# Patient Record
Sex: FEMALE | Race: WHITE
[De-identification: names, ages, dates, MRNs, and addresses within clinical notes are randomized per-mention and may not be internally consistent; named-entity substitution may affect disease eponyms.]

---

## 2018-05-02 ENCOUNTER — HOSPITAL ENCOUNTER (EMERGENCY)
Dept: HOSPITAL 11 - JP.ED | Age: 83
Discharge: HOME | End: 2018-05-02
Payer: MEDICARE

## 2018-05-02 VITALS — SYSTOLIC BLOOD PRESSURE: 134 MMHG | DIASTOLIC BLOOD PRESSURE: 98 MMHG

## 2018-05-02 DIAGNOSIS — I10: ICD-10-CM

## 2018-05-02 DIAGNOSIS — E78.00: ICD-10-CM

## 2018-05-02 DIAGNOSIS — Z79.899: ICD-10-CM

## 2018-05-02 DIAGNOSIS — R55: Primary | ICD-10-CM

## 2018-05-02 DIAGNOSIS — Z79.82: ICD-10-CM

## 2018-05-02 NOTE — EDM.PDOC
ED HPI GENERAL MEDICAL PROBLEM





- General


Chief Complaint: Syncope


Stated Complaint: FALL VIA NORTH


Time Seen by Provider: 05/02/18 18:41


Source of Information: Reports: Patient, RN Notes Reviewed


History Limitations: Reports: No Limitations





- History of Present Illness


INITIAL COMMENTS - FREE TEXT/NARRATIVE: 





85-year-old female presents to the emergency department today via EMS services 

for a syncopal event, she was standing in line at the motor vehicle department 

I suddenly felt lightheaded next thing she knew she awoke on the floor people 

standing around her, there is no confusion afterwards was able to communicate 

with EMS staff upon arrival. She has had an event similar to this in the past 

with no known etiology she denies any new medication states she's been eating 

and drinking okay today. At this time she is asymptomatic





- Related Data


 Allergies











Allergy/AdvReac Type Severity Reaction Status Date / Time


 


No Known Allergies Allergy   Verified 05/02/18 16:03











Home Meds: 


 Home Meds





Ascorbic Acid [Vitamin C] 500 mg PO DAILY 04/19/16 [History]


Aspirin [Ecotrin] 325 mg PO DAILY 04/19/16 [History]


Calcium Carbonate/Vitamin D3 [Calcium 500 + Vit D 400] 1 each PO DAILY 04/19/16 

[History]


Hydrochlorothiazide/Valsartan [Diovan HCT 80-12.5 MG] 0.5 tab PO BID 04/19/16 [

History]


Metoprolol Tartrate 12.5 mg PO BID 04/19/16 [History]


Multivitamin/Iron/Folic Acid [Centrum Adults Tablet] 1 tab PO DAILY 05/02/18 [

History]











Past Medical History


HEENT History: Reports: Impaired Vision


Cardiovascular History: Reports: Angina, CAD, High Cholesterol, Hypertension, MI

, Syncope


Musculoskeletal History: Reports: Fracture


Other Musculoskeletal History: wrist


Psychiatric History: Reports: Anxiety





- Infectious Disease History


Infectious Disease History: Reports: Chicken Pox, Measles, Mumps





- Past Surgical History


Cardiovascular Surgical History: Reports: Percutaneous Transluminal Angioplasty


GI Surgical History: Reports: Colonoscopy





Social & Family History





- Tobacco Use


Smoking Status *Q: Never Smoker





- Caffeine Use


Caffeine Use: Reports: Tea





- Recreational Drug Use


Recreational Drug Use: No





ED ROS GENERAL





- Review of Systems


Review Of Systems: See Below


Constitutional: Reports: No Symptoms


HEENT: Reports: No Symptoms


Respiratory: Reports: No Symptoms


Cardiovascular: Reports: Syncope


GI/Abdominal: Reports: No Symptoms


: Reports: No Symptoms


Musculoskeletal: Reports: No Symptoms


Skin: Reports: No Symptoms


Neurological: Reports: Syncope





ED EXAM, GENERAL





- Physical Exam


Exam: See Below


Free Text/Narrative:: 





Tender across the lower back pelvic region no specific point tenderness


Exam Limited By: No Limitations


General Appearance: Alert, WD/WN, No Apparent Distress


Eye Exam: Bilateral Eye: EOMI, Normal Inspection, PERRL


Ears: Normal External Exam, Normal Canal, Hearing Grossly Normal, Normal TMs


Nose: Normal Inspection, Normal Mucosa, No Blood


Throat/Mouth: Normal Inspection, Normal Lips, Normal Teeth, Normal Gums, Normal 

Oropharynx, Normal Voice, No Airway Compromise


Head: Atraumatic, Normocephalic


Neck: Normal Inspection


Respiratory/Chest: No Respiratory Distress, Lungs Clear, Normal Breath Sounds, 

No Accessory Muscle Use


Cardiovascular: Regular Rate, Rhythm, No Murmur


GI/Abdominal: Soft, Non-Tender


Back Exam: Normal Inspection, Full Range of Motion.  No: CVA Tenderness (R), 

CVA Tenderness (L), Paraspinal Tenderness, Vertebral Tenderness





Course





- Vital Signs


Last Recorded V/S: 


 Last Vital Signs











Temp  97.3 F   05/02/18 16:04


 


Pulse  73   05/02/18 18:51


 


Resp  19   05/02/18 19:48


 


BP  134/98 H  05/02/18 19:48


 


Pulse Ox  90 L  05/02/18 19:48














- Orders/Labs/Meds


Orders: 


 Active Orders 24 hr











 Category Date Time Status


 


 EKG Documentation Completion [RC] ASDIRECTED Care  05/02/18 18:55 Active


 


 Lumbar Spine Min 4V [CR] Stat Exams  05/02/18 18:56 Ordered


 


 Pelvis 1V or 2V [CR] Stat Exams  05/02/18 18:56 Ordered


 


 UA W/MICROSCOPIC [URIN] Urgent Lab  05/02/18 19:19 Ordered


 


 EKG 12 Lead [EK] Stat Ther  05/02/18 18:55 Ordered











Labs: 


 Laboratory Tests











  05/02/18 05/02/18 05/02/18 Range/Units





  18:58 18:58 18:58 


 


WBC  6.2    (4.5-11.0)  K/uL


 


RBC  4.59    (3.30-5.50)  M/uL


 


Hgb  13.4    (12.0-15.0)  g/dL


 


Hct  40.9    (36.0-48.0)  %


 


MCV  89    (80-98)  fL


 


MCH  29    (27-31)  pg


 


MCHC  33    (32-36)  %


 


Plt Count  116 L    (150-400)  K/uL


 


Neut % (Auto)  84 H    (36-66)  %


 


Lymph % (Auto)  8 L    (24-44)  %


 


Mono % (Auto)  8 H    (2-6)  %


 


Eos % (Auto)  0 L    (2-4)  %


 


Baso % (Auto)  0    (0-1)  %


 


Sodium    140  (140-148)  mmol/L


 


Potassium    3.8  (3.6-5.2)  mmol/L


 


Chloride    102  (100-108)  mmol/L


 


Carbon Dioxide    31  (21-32)  mmol/L


 


Anion Gap    7.3  (5.0-14.0)  mmol/L


 


BUN    18  (7-18)  mg/dL


 


Creatinine    0.9  (0.6-1.0)  mg/dL


 


Est Cr Clr Drug Dosing    37.80  mL/min


 


Estimated GFR (MDRD)    60  (>60)  


 


Glucose    126 H  ()  mg/dL


 


Calcium    9.1  (8.5-10.1)  mg/dL


 


CK-MB (CK-2)   0.7   (0-3.6)  mg/mL


 


Troponin I   < 0.017   (0.000-0.056)  ng/mL


 


Urine Color     


 


Urine Appearance     


 


Urine pH     (4.5-8.0)  


 


Ur Specific Gravity     (1.008-1.030)  


 


Urine Protein     (NEGATIVE)  mg/dL


 


Urine Glucose (UA)     (NEGATIVE)  mg/dL


 


Urine Ketones     (NEGATIVE)  mg/dL


 


Urine Occult Blood     (NEGATIVE)  


 


Urine Nitrite     (NEGATIVE)  


 


Urine Bilirubin     (NEGATIVE)  


 


Urine Urobilinogen     (NORMAL)  mg/dL


 


Ur Leukocyte Esterase     (NEGATIVE)  


 


Urine RBC     (0-5)  


 


Urine WBC     (0-5)  


 


Ur Epithelial Cells     


 


Amorphous Sediment     


 


Urine Bacteria     


 


Urine Mucus     














  05/02/18 Range/Units





  19:19 


 


WBC   (4.5-11.0)  K/uL


 


RBC   (3.30-5.50)  M/uL


 


Hgb   (12.0-15.0)  g/dL


 


Hct   (36.0-48.0)  %


 


MCV   (80-98)  fL


 


MCH   (27-31)  pg


 


MCHC   (32-36)  %


 


Plt Count   (150-400)  K/uL


 


Neut % (Auto)   (36-66)  %


 


Lymph % (Auto)   (24-44)  %


 


Mono % (Auto)   (2-6)  %


 


Eos % (Auto)   (2-4)  %


 


Baso % (Auto)   (0-1)  %


 


Sodium   (140-148)  mmol/L


 


Potassium   (3.6-5.2)  mmol/L


 


Chloride   (100-108)  mmol/L


 


Carbon Dioxide   (21-32)  mmol/L


 


Anion Gap   (5.0-14.0)  mmol/L


 


BUN   (7-18)  mg/dL


 


Creatinine   (0.6-1.0)  mg/dL


 


Est Cr Clr Drug Dosing   mL/min


 


Estimated GFR (MDRD)   (>60)  


 


Glucose   ()  mg/dL


 


Calcium   (8.5-10.1)  mg/dL


 


CK-MB (CK-2)   (0-3.6)  mg/mL


 


Troponin I   (0.000-0.056)  ng/mL


 


Urine Color  Yellow  


 


Urine Appearance  Clear  


 


Urine pH  7.0  (4.5-8.0)  


 


Ur Specific Gravity  1.015  (1.008-1.030)  


 


Urine Protein  Negative  (NEGATIVE)  mg/dL


 


Urine Glucose (UA)  Normal  (NEGATIVE)  mg/dL


 


Urine Ketones  Negative  (NEGATIVE)  mg/dL


 


Urine Occult Blood  Negative  (NEGATIVE)  


 


Urine Nitrite  Negative  (NEGATIVE)  


 


Urine Bilirubin  Negative  (NEGATIVE)  


 


Urine Urobilinogen  Normal  (NORMAL)  mg/dL


 


Ur Leukocyte Esterase  Negative  (NEGATIVE)  


 


Urine RBC  5-10 H  (0-5)  


 


Urine WBC  0-5  (0-5)  


 


Ur Epithelial Cells  Few  


 


Amorphous Sediment  Few  


 


Urine Bacteria  Many  


 


Urine Mucus  Moderate  














Departure





- Departure


Time of Disposition: 20:11


Disposition: Home, Self-Care 01


Condition: Good


Clinical Impression: 


Syncope


Qualifiers:


 Syncope type: vasovagal syncope Qualified Code(s): R55 - Syncope and collapse





Referrals: 


PCP,None [Primary Care Provider] - 


Forms:  ED Department Discharge


Additional Instructions: 


Take ibuprofen or Tylenol as needed for pain control,  Please followup with 

your primary care provider in 3-5 days if not better, please call return to the 

emergency department with worsening of symptoms.





- My Orders


Last 24 Hours: 


My Active Orders





05/02/18 18:55


EKG Documentation Completion [RC] ASDIRECTED 


EKG 12 Lead [EK] Stat 





05/02/18 18:56


Lumbar Spine Min 4V [CR] Stat 


Pelvis 1V or 2V [CR] Stat 





05/02/18 19:19


UA W/MICROSCOPIC [URIN] Urgent 














- Assessment/Plan


Last 24 Hours: 


My Active Orders





05/02/18 18:55


EKG Documentation Completion [RC] ASDIRECTED 


EKG 12 Lead [EK] Stat 





05/02/18 18:56


Lumbar Spine Min 4V [CR] Stat 


Pelvis 1V or 2V [CR] Stat 





05/02/18 19:19


UA W/MICROSCOPIC [URIN] Urgent 











Plan: 





Assessment





Acuity = acute





Site and laterality = syncopal event  





Etiology  = suspicious for vasovagal  





Manifestations = none  





Location of injury =  Home





Lab values = CBC, CMP, urinalysis unremarkable EKG demonstrates sinus rhythm 

similar to prior EKG  plain film of pelvis and lumbar region I did review films 

myself I cannot appreciate any acute process, the official read from radiology 

is pending





Plan


She remained asymptomatic while in the emergency department, plan is to 

discharge home follow-up primary care 3-5 days if no improvement  

















 This note was dictated using dragon voice recognition software please call 

with any questions on syntax or tawnya.

## 2018-05-03 NOTE — CR
PELVIS AP

Clinical History: Pelvic pain

 

Findings:  No acute fracture or dislocation is noted.  No destructive changes are present. There is s
evere degenerative changes in both hips with loss of joint spaces. There is been some medial migratio
n of the femoral heads bilaterally. There are moderate degenerative changes also in the lumbar spine.


 

Impression: Severe degenerative changes  in both hips. This could be from rheumatoid arthritis. Clini
alanis correlation necessary

## 2018-05-03 NOTE — CR
Lumbar Spine Min 4V

 

CLINICAL HISTORY: Back pain, fall

 

FINDINGS: There is some superior endplate compression deformity at L2. Chronology is uncertain area t
here is some diffuse degenerative disc disease with spondylosis. There is disc space narrowing at the
 L4-5 and L5-S1. There is osteoarthropathy in the mid to lower lumbar facets.

 

IMPRESSION: Mild superior endplate compression deformity of L2 of unknown age

 

 Moderate degenerative disc disease

 

Moderate diffuse osteoarthropathy throughout the mid to low lumbar facets

## 2018-05-04 ENCOUNTER — HOSPITAL ENCOUNTER (EMERGENCY)
Dept: HOSPITAL 11 - JP.ED | Age: 83
Discharge: HOME | End: 2018-05-04
Payer: MEDICARE

## 2018-05-04 VITALS — DIASTOLIC BLOOD PRESSURE: 71 MMHG | SYSTOLIC BLOOD PRESSURE: 157 MMHG

## 2018-05-04 DIAGNOSIS — Z79.899: ICD-10-CM

## 2018-05-04 DIAGNOSIS — S32.2XXD: Primary | ICD-10-CM

## 2018-05-04 DIAGNOSIS — F41.9: ICD-10-CM

## 2018-05-04 DIAGNOSIS — I25.2: ICD-10-CM
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
## 2018-05-04 DIAGNOSIS — Z79.82: ICD-10-CM

## 2018-05-04 DIAGNOSIS — E78.00: ICD-10-CM

## 2018-05-04 DIAGNOSIS — I10: ICD-10-CM

## 2018-05-04 PROCEDURE — 99284 EMERGENCY DEPT VISIT MOD MDM: CPT

## 2018-05-04 PROCEDURE — 85379 FIBRIN DEGRADATION QUANT: CPT

## 2018-05-04 PROCEDURE — 71275 CT ANGIOGRAPHY CHEST: CPT

## 2018-05-04 PROCEDURE — 36415 COLL VENOUS BLD VENIPUNCTURE: CPT

## 2018-05-04 PROCEDURE — 72192 CT PELVIS W/O DYE: CPT

## 2018-05-04 PROCEDURE — 96374 THER/PROPH/DIAG INJ IV PUSH: CPT

## 2018-05-04 NOTE — EDM.PDOC
ED HPI GENERAL MEDICAL PROBLEM





- General


Chief Complaint: Back Pain or Injury


Stated Complaint: MEDICAL VIA NORTH


Time Seen by Provider: 05/04/18 09:05


Source of Information: Reports: Patient, EMS, Old Records, RN


History Limitations: Reports: No Limitations





- History of Present Illness


INITIAL COMMENTS - FREE TEXT/NARRATIVE: 





86 yo female had a syncopal episode 2 days ago at the [facility] while she had been 

standing for an extended period of time. With the syncope she landed forcibly 

on her coccyx and was brought here for eval. Blood work showed a mildly 

elevated BNP, otherwise normal. X-rays showed DJD and a possible L2 compression 

fx undetermined age. She was sent home to use OTC analgesia. During that visit 

she had a pulse ox reading of about 90% and has no hx of lung dz. 





Today she returns via EMS with a complaint of sacral/coccyx pain. She last took 

ibuprofen last night for pain relief. "Someone told her not to take any pain 

medicine today". She is not SOB. She denies cough or fever, does have a little 

post-nasal drip. Has no hx of asthma. 


Onset: Sudden


Onset Date: 05/02/18


Duration: Day(s): (2), Constant


Location: Reports: Pelvis (posteriorly)


Quality: Reports: Ache


Severity: Moderate


Improves with: Reports: Medication


Worsens with: Reports: Movement


Context: Reports: Trauma


Associated Symptoms: Reports: No Other Symptoms.  Denies: Diaphoresis, Fever/

Chills, Nausea/Vomiting


Treatments PTA: Reports: Other (see below) (none)


  ** Lower Sacral


Pain Score (Numeric/FACES): 7





- Related Data


 Allergies











Allergy/AdvReac Type Severity Reaction Status Date / Time


 


No Known Allergies Allergy   Verified 05/02/18 16:03











Home Meds: 


 Home Meds





Ascorbic Acid [Vitamin C] 500 mg PO DAILY 04/19/16 [History]


Aspirin [Ecotrin] 325 mg PO DAILY 04/19/16 [History]


Calcium Carbonate/Vitamin D3 [Calcium 500 + Vit D 400] 1 each PO DAILY 04/19/16 

[History]


Hydrochlorothiazide/Valsartan [Diovan HCT 80-12.5 MG] 0.5 tab PO BID 04/19/16 [

History]


Metoprolol Tartrate 12.5 mg PO BID 04/19/16 [History]


Multivitamin/Iron/Folic Acid [Centrum Adults Tablet] 1 tab PO DAILY 05/02/18 [

History]


Escitalopram [Lexapro] 10 mg PO DAILY 05/04/18 [History]











Past Medical History


HEENT History: Reports: Impaired Vision


Cardiovascular History: Reports: Angina, CAD, High Cholesterol, Hypertension, MI

, Syncope


Musculoskeletal History: Reports: Fracture


Other Musculoskeletal History: wrist


Psychiatric History: Reports: Anxiety


Hematologic History: Reports: Hemochromatosis





- Infectious Disease History


Infectious Disease History: Reports: Chicken Pox, Measles, Mumps





- Past Surgical History


Cardiovascular Surgical History: Reports: Percutaneous Transluminal Angioplasty


GI Surgical History: Reports: Colonoscopy





Social & Family History





- Tobacco Use


Smoking Status *Q: Never Smoker





- Caffeine Use


Caffeine Use: Reports: Coffee





- Recreational Drug Use


Recreational Drug Use: No





ED ROS GENERAL





- Review of Systems


Review Of Systems: See Below


Constitutional: Reports: No Symptoms


HEENT: Reports: No Symptoms


Respiratory: Reports: No Symptoms


Cardiovascular: Reports: No Symptoms


GI/Abdominal: Reports: No Symptoms


: Reports: No Symptoms


Musculoskeletal: Reports: Back Pain (low)


Skin: Reports: No Symptoms


Neurological: Reports: No Symptoms





ED EXAM,LOWER BACK PAIN/INJURY





- Physical Exam


Exam: See Below


Exam Limited By: No Limitations


General Appearance: Alert, WD/WN, No Apparent Distress


Eye Exam: Bilateral Eye: Normal Inspection


Ears: Normal External Exam, Normal Canal, Hearing Grossly Normal


Nose: Normal Inspection, Normal Mucosa, No Blood


Throat/Mouth: Normal Inspection, Normal Lips, Normal Oropharynx, Normal Voice, 

No Airway Compromise


Head: Atraumatic, Normocephalic


Neck: Normal Inspection


Respiratory/Chest: No Respiratory Distress, Lungs Clear, Normal Breath Sounds, 

No Accessory Muscle Use


Cardiovascular: Regular Rate, Rhythm, No Edema


GI/Abdominal: Normal Bowel Sounds, Soft, Non-Tender, No Distention


Back Exam: Normal Inspection, Other (Tender over sacrum and coccyx areas, not 

tender over L2).  No: CVA Tenderness (R), CVA Tenderness (L)


Extremities: Normal Inspection, Normal Range of Motion, Non-Tender, No Pedal 

Edema


Neurological: Alert, Normal Mood/Affect, CN II-XII Intact, No Motor/Sensory 

Deficits, Oriented x 3


Psychiatric: Normal Affect, Normal Mood


Skin Exam: Warm, Dry, Intact, Normal Color, No Rash.  No: Ecchymosis, Erythema, 

Increased Warmth


Lymphatic: No Adenopathy





Course





- Vital Signs


Text/Narrative:: 





Social service consult obtained regarding home care. 


Last Recorded V/S: 


 Last Vital Signs











Temp  35.4 C   05/04/18 09:02


 


Pulse  80   05/04/18 09:02


 


Resp  16   05/04/18 09:02


 


BP  157/71 H  05/04/18 09:02


 


Pulse Ox  91 L  05/04/18 09:02














- Orders/Labs/Meds


Orders: 


 Active Orders 24 hr











 Category Date Time Status


 


 Iopamidol [Isovue-370 (76%)] Med  05/04/18 10:15 Active





 65 ml IV .AS DIRECTED   


 


 Sodium Chloride 0.9% [Saline Flush] Med  05/04/18 09:13 Active





 10 ml FLUSH ASDIRECTED PRN   


 


 Sodium Chloride 0.9% [Saline Flush] Med  05/04/18 10:02 Active





 10 ml FLUSH ONETIME PRN   


 


 Saline Lock Insert [OM.PC] Routine Oth  05/04/18 09:13 Ordered








 Medication Orders





Iopamidol (Isovue-370 (76%))  65 ml IV .AS DIRECTED MONSTER


   Last Admin: 05/04/18 10:21  Dose: 100 ml


Sodium Chloride (Saline Flush)  10 ml FLUSH ASDIRECTED PRN


   PRN Reason: Keep Vein Open


   Last Admin: 05/04/18 09:29  Dose: 10 ml


Sodium Chloride (Saline Flush)  10 ml FLUSH ONETIME PRN


   PRN Reason: PER RADIOLOGY PROTOCOL


   Last Admin: 05/04/18 10:21  Dose: 10 ml








Labs: 


 Laboratory Tests











  05/04/18 Range/Units





  09:25 


 


D-Dimer, Quantitative  4170 H  (0.0-400.0)  ng/mL











Meds: 


Medications











Generic Name Dose Route Start Last Admin





  Trade Name Freq  PRN Reason Stop Dose Admin


 


Iopamidol  65 ml  05/04/18 10:15  05/04/18 10:21





  Isovue-370 (76%)  IV   100 ml





  .AS DIRECTED MONSTER   Administration





     





     





     





     


 


Sodium Chloride  10 ml  05/04/18 09:13  05/04/18 09:29





  Saline Flush  FLUSH   10 ml





  ASDIRECTED PRN   Administration





  Keep Vein Open   





     





     





     


 


Sodium Chloride  10 ml  05/04/18 10:02  05/04/18 10:21





  Saline Flush  FLUSH   10 ml





  ONETIME PRN   Administration





  PER RADIOLOGY PROTOCOL   





     





     





     














Discontinued Medications














Generic Name Dose Route Start Last Admin





  Trade Name Freq  PRN Reason Stop Dose Admin


 


Acetaminophen  1,000 mg  05/04/18 09:15  05/04/18 09:26





  Tylenol Extra Strength  PO  05/04/18 09:16  1,000 mg





  ONETIME ONE   Administration





     





     





     





     


 


Sodium Chloride  80 mls @ 3 mls/sec  05/04/18 10:02  05/04/18 10:21





  Normal Saline  IV  05/04/18 10:03  3 mls/sec





  ONETIME ONE   Administration





     





     





     





     


 


Ketorolac Tromethamine  15 mg  05/04/18 09:13  05/04/18 09:26





  Toradol  IVPUSH  05/04/18 09:14  15 mg





  ONETIME ONE   Administration





     





     





     





     














- Radiology Interpretation


Free Text/Narrative:: 





CTA chest-no PE





CT pelvis-distal coccyx fx


CT Results Date: 05/04/18





Departure





- Departure


Time of Disposition: 12:14


Disposition: Home, Self-Care 01


Condition: Fair


Clinical Impression: 


Fractured coccyx


Qualifiers:


 Encounter type: subsequent encounter Fracture type: closed Fracture healing: 

with routine healing Qualified Code(s): S32.2XXD - Fracture of coccyx, 

subsequent encounter for fracture with routine healing








- Discharge Information


Referrals: 


PCP,None [Primary Care Provider] - 


Forms:  ED Department Discharge





- My Orders


Last 24 Hours: 


My Active Orders





05/04/18 09:13


Sodium Chloride 0.9% [Saline Flush]   10 ml FLUSH ASDIRECTED PRN 


Saline Lock Insert [OM.PC] Routine 





05/04/18 10:02


Sodium Chloride 0.9% [Saline Flush]   10 ml FLUSH ONETIME PRN 





05/04/18 10:15


Iopamidol [Isovue-370 (76%)]   65 ml IV .AS DIRECTED 














- Assessment/Plan


Last 24 Hours: 


My Active Orders





05/04/18 09:13


Sodium Chloride 0.9% [Saline Flush]   10 ml FLUSH ASDIRECTED PRN 


Saline Lock Insert [OM.PC] Routine 





05/04/18 10:02


Sodium Chloride 0.9% [Saline Flush]   10 ml FLUSH ONETIME PRN 





05/04/18 10:15


Iopamidol [Isovue-370 (76%)]   65 ml IV .AS DIRECTED

## 2018-05-04 NOTE — CT
CTA CHEST

 

CLINICAL HISTORY: APPROXIMATELY A, ELEVATED D-DIMER

 

TECHNIQUE: THIN SECTION AXIAL CONTIGUOUS TOMOGRAPHIC SECTIONS WERE TAKEN THROUGH THE CHEST BEFORE AND
 AFTER  ML BOLUS IV IODINATED CONTRAST ADMINISTRATION. CORONAL AND SAGITTAL IMAGES WERE RECONSTRUCTED
. 

 

FINDINGS: THERE IS MILD DIFFUSE INTERSTITIAL PROMINENCE. THERE IS MILD BRONCHIECTASIS WITH SOME MILD 
DIFFUSE BRONCHIAL THICKENING. THERE IS SOME PATCHY PLEURAL PARENCHYMAL SCARRING IN BOTH LUNG BASES. T
HIS IS MOST NOTABLE IN THE LINGULA.

NO PULMONARY ARTERY FILLING DEFECTS ARE IDENTIFIED. THE AORTA IS FREE OF ANEURYSM OR DISSECTION. THER
E ARE NO PLEURAL EFFUSIONS

 

 

IMPRESSION: MILD DIFFUSE CHRONIC LUNG CHANGES WITH SOME BRONCHIECTASIS AND BRONCHIAL THICKENING

 

SCATTERED AREAS OF PLEURAL PARENCHYMAL SCARRING

 

NO EVIDENCE OF PULMONARY EMBOLUS

## 2018-05-04 NOTE — CT
Pelvis wo Cont

 

CLINICAL HISTORY: Fall, pelvic pain 

 

COMPARISON: None

 

TECHNIQUE: Multiple contiguous axial sections were obtained from the level of the iliac crests down t
o the pubic symphysis without 

 From these images 3D reconstructions were obtained and reviewed . Auto dosage reduction and iterativ
e reconstruction techniques employed.

 

FINDINGS: 1 window images show the pelvic bones to be intact. There are severe degenerative changes i
n both hips with joint space narrowing periarticular spurring and subcortical cyst formation. Sagitta
l images show small linear defect through the second coccygeal segment consistent with a nondisplaced
 fracture. Sacrum appears intact. There are some degenerative changes in the SI joints with some vacu
um phenomenon.

 

IMPRESSION: Nondisplaced fracture of the second coccygeal segment

 

Severe degenerative changes in both hips

## 2018-05-08 ENCOUNTER — HOSPITAL ENCOUNTER (EMERGENCY)
Dept: HOSPITAL 11 - JP.ED | Age: 83
Discharge: HOME | End: 2018-05-08
Payer: MEDICARE

## 2018-05-08 VITALS — SYSTOLIC BLOOD PRESSURE: 160 MMHG | DIASTOLIC BLOOD PRESSURE: 98 MMHG

## 2018-05-08 DIAGNOSIS — I10: ICD-10-CM

## 2018-05-08 DIAGNOSIS — I25.2: ICD-10-CM

## 2018-05-08 DIAGNOSIS — Z79.82: ICD-10-CM

## 2018-05-08 DIAGNOSIS — I16.0: Primary | ICD-10-CM

## 2018-05-08 DIAGNOSIS — Z79.899: ICD-10-CM

## 2018-05-08 PROCEDURE — 83880 ASSAY OF NATRIURETIC PEPTIDE: CPT

## 2018-05-08 PROCEDURE — 70450 CT HEAD/BRAIN W/O DYE: CPT

## 2018-05-08 PROCEDURE — 80048 BASIC METABOLIC PNL TOTAL CA: CPT

## 2018-05-08 PROCEDURE — 85025 COMPLETE CBC W/AUTO DIFF WBC: CPT

## 2018-05-08 PROCEDURE — 36415 COLL VENOUS BLD VENIPUNCTURE: CPT

## 2018-05-08 PROCEDURE — 99285 EMERGENCY DEPT VISIT HI MDM: CPT

## 2018-05-08 PROCEDURE — 81001 URINALYSIS AUTO W/SCOPE: CPT

## 2018-05-08 PROCEDURE — 71046 X-RAY EXAM CHEST 2 VIEWS: CPT

## 2018-05-08 PROCEDURE — G0480 DRUG TEST DEF 1-7 CLASSES: HCPCS

## 2018-05-08 NOTE — EDM.PDOC
<Jenny Berrios - Last Filed: 05/08/18 18:43>





ED HPI GENERAL MEDICAL PROBLEM





- General


Chief Complaint: Cardiovascular Problem


Stated Complaint: MEDICAL VIA NORTH


Time Seen by Provider: 05/08/18 18:09


Source of Information: Reports: Patient


History Limitations: Reports: No Limitations





- History of Present Illness


INITIAL COMMENTS - FREE TEXT/NARRATIVE: 





pt arrived with her bp being elevated. She has been  taking her meds.  She 

states she can,t think  right today. She  Has not taken the norco today. She 

has just been using the ibuprofen.  


Onset: Today


Duration: Hour(s):, Other ( she does not have a sig hesdache. )


Location: Reports: Head


  ** Sacral


Pain Score (Numeric/FACES): 3





- Related Data


 Allergies











Allergy/AdvReac Type Severity Reaction Status Date / Time


 


No Known Allergies Allergy   Verified 05/08/18 15:11











Home Meds: 


 Home Meds





Ascorbic Acid [Vitamin C] 500 mg PO DAILY 04/19/16 [History]


Aspirin [Ecotrin] 325 mg PO DAILY 04/19/16 [History]


Calcium Carbonate/Vitamin D3 [Calcium 500 + Vit D 400] 1 each PO DAILY 04/19/16 

[History]


Hydrochlorothiazide/Valsartan [Diovan HCT 80-12.5 MG] 0.5 tab PO BID 04/19/16 [

History]


Metoprolol Tartrate 12.5 mg PO BID 04/19/16 [History]


Multivitamin/Iron/Folic Acid [Centrum Adults Tablet] 1 tab PO DAILY 05/02/18 [

History]


Acetaminophen/HYDROcodone [Norco 325-5 MG] 1 - 2 tab PO Q6H PRN #30 tab 05/04/ 18 [Rx]


Escitalopram [Lexapro] 10 mg PO DAILY 05/04/18 [History]











Past Medical History


HEENT History: Reports: Impaired Vision


Cardiovascular History: Reports: Angina, CAD, High Cholesterol, Hypertension, MI

, Syncope


Musculoskeletal History: Reports: Fracture


Other Musculoskeletal History: wrist


Psychiatric History: Reports: Anxiety


Hematologic History: Reports: Hemochromatosis





- Infectious Disease History


Infectious Disease History: Reports: Chicken Pox, Measles, Mumps





- Past Surgical History


Cardiovascular Surgical History: Reports: Percutaneous Transluminal Angioplasty


GI Surgical History: Reports: Colonoscopy





Social & Family History





- Tobacco Use


Smoking Status *Q: Unknown Ever Smoked





- Caffeine Use


Caffeine Use: Reports: Coffee





ED ROS GENERAL





- Review of Systems


Review Of Systems: See Below


Constitutional: Reports: No Symptoms


HEENT: Reports: No Symptoms


Respiratory: Reports: No Symptoms


Cardiovascular: Reports: Palpitations


Endocrine: Reports: No Symptoms


GI/Abdominal: Reports: No Symptoms


: Reports: No Symptoms


Musculoskeletal: Reports: No Symptoms


Skin: Reports: No Symptoms


Neurological: Reports: Other ( She feels like she is not thinking straight. )


Psychiatric: Reports: Anxiety





ED EXAM, GENERAL





- Physical Exam


Exam: See Below


Free Text/Narrative:: 





pt arrived with her bp being up and she just does not feel right. 


Exam Limited By: No Limitations


General Appearance: Alert, Mild Distress, Other (pupils are equal and reactive)


Ears: Normal TMs


Nose: Normal Inspection


Throat/Mouth: Normal Inspection


Head: Other (pt has slight tenderness on the back of her head. )


Neck: Normal Inspection


Respiratory/Chest: No Respiratory Distress, Other (pt states she has been 

somewhat sob with activity. )


Cardiovascular: Regular Rate, Rhythm, Other ( bp is elevated 190/70. )


GI/Abdominal: Soft, Non-Tender


Rectal (Female) Exam: Deferred


Back Exam: Normal Inspection


Extremities: Normal Inspection


Neurological: Alert, Oriented


Psychiatric: Anxious





Course





- Vital Signs


Last Recorded V/S: 





 Last Vital Signs











Temp  97.0 F   05/08/18 17:46


 


Pulse  62   05/08/18 21:05


 


Resp  15   05/08/18 21:05


 


BP  160/98 H  05/08/18 21:29


 


Pulse Ox  96   05/08/18 20:39














- Orders/Labs/Meds


Orders: 





 Active Orders 24 hr











 Category Date Time Status


 


 Chest 2V [CR] Stat Exams  05/08/18 17:13 Taken


 


 Head wo Cont [CT] Stat Exams  05/08/18 18:43 Taken


 


 UA W/MICROSCOPIC [URIN] Urgent Lab  05/08/18 16:04 Ordered











Labs: 





 Laboratory Tests











  05/08/18 05/08/18 05/08/18 Range/Units





  16:04 16:14 16:14 


 


WBC    5.4  (4.5-11.0)  K/uL


 


RBC    4.63  (3.30-5.50)  M/uL


 


Hgb    13.7  (12.0-15.0)  g/dL


 


Hct    40.6  (36.0-48.0)  %


 


MCV    88  (80-98)  fL


 


MCH    30  (27-31)  pg


 


MCHC    34  (32-36)  %


 


Plt Count    250  (150-400)  K/uL


 


Neut % (Auto)    64  (36-66)  %


 


Lymph % (Auto)    16 L  (24-44)  %


 


Mono % (Auto)    13 H  (2-6)  %


 


Eos % (Auto)    6 H  (2-4)  %


 


Baso % (Auto)    1  (0-1)  %


 


Sodium   138 L   (140-148)  mmol/L


 


Potassium   4.0   (3.6-5.2)  mmol/L


 


Chloride   100   (100-108)  mmol/L


 


Carbon Dioxide   29   (21-32)  mmol/L


 


Anion Gap   13.0   (5.0-14.0)  mmol/L


 


BUN   14   (7-18)  mg/dL


 


Creatinine   0.8   (0.6-1.0)  mg/dL


 


Est Cr Clr Drug Dosing   40.66   mL/min


 


Estimated GFR (MDRD)   > 60   (>60)  


 


Glucose   115 H   ()  mg/dL


 


Calcium   9.6   (8.5-10.1)  mg/dL


 


NT-Pro-B Natriuret Pep     (5-450)  pg/mL


 


Urine Color  Yellow    


 


Urine Appearance  Clear    


 


Urine pH  7.0    (4.5-8.0)  


 


Ur Specific Gravity  1.010    (1.008-1.030)  


 


Urine Protein  Negative    (NEGATIVE)  mg/dL


 


Urine Glucose (UA)  Normal    (NEGATIVE)  mg/dL


 


Urine Ketones  Negative    (NEGATIVE)  mg/dL


 


Urine Occult Blood  Negative    (NEGATIVE)  


 


Urine Nitrite  Negative    (NEGATIVE)  


 


Urine Bilirubin  Negative    (NEGATIVE)  


 


Urine Urobilinogen  Normal    (NORMAL)  mg/dL


 


Ur Leukocyte Esterase  Negative    (NEGATIVE)  


 


Urine RBC  Not seen    (0-5)  


 


Urine WBC  0-5    (0-5)  


 


Ur Epithelial Cells  Rare    


 


Amorphous Sediment  Not seen    


 


Urine Bacteria  Rare    


 


Urine Mucus  Not seen    


 


Ethyl Alcohol     mg/dL














  05/08/18 05/08/18 Range/Units





  16:14 16:14 


 


WBC    (4.5-11.0)  K/uL


 


RBC    (3.30-5.50)  M/uL


 


Hgb    (12.0-15.0)  g/dL


 


Hct    (36.0-48.0)  %


 


MCV    (80-98)  fL


 


MCH    (27-31)  pg


 


MCHC    (32-36)  %


 


Plt Count    (150-400)  K/uL


 


Neut % (Auto)    (36-66)  %


 


Lymph % (Auto)    (24-44)  %


 


Mono % (Auto)    (2-6)  %


 


Eos % (Auto)    (2-4)  %


 


Baso % (Auto)    (0-1)  %


 


Sodium    (140-148)  mmol/L


 


Potassium    (3.6-5.2)  mmol/L


 


Chloride    (100-108)  mmol/L


 


Carbon Dioxide    (21-32)  mmol/L


 


Anion Gap    (5.0-14.0)  mmol/L


 


BUN    (7-18)  mg/dL


 


Creatinine    (0.6-1.0)  mg/dL


 


Est Cr Clr Drug Dosing    mL/min


 


Estimated GFR (MDRD)    (>60)  


 


Glucose    ()  mg/dL


 


Calcium    (8.5-10.1)  mg/dL


 


NT-Pro-B Natriuret Pep  2413 H   (5-450)  pg/mL


 


Urine Color    


 


Urine Appearance    


 


Urine pH    (4.5-8.0)  


 


Ur Specific Gravity    (1.008-1.030)  


 


Urine Protein    (NEGATIVE)  mg/dL


 


Urine Glucose (UA)    (NEGATIVE)  mg/dL


 


Urine Ketones    (NEGATIVE)  mg/dL


 


Urine Occult Blood    (NEGATIVE)  


 


Urine Nitrite    (NEGATIVE)  


 


Urine Bilirubin    (NEGATIVE)  


 


Urine Urobilinogen    (NORMAL)  mg/dL


 


Ur Leukocyte Esterase    (NEGATIVE)  


 


Urine RBC    (0-5)  


 


Urine WBC    (0-5)  


 


Ur Epithelial Cells    


 


Amorphous Sediment    


 


Urine Bacteria    


 


Urine Mucus    


 


Ethyl Alcohol   < 3  mg/dL











Meds: 





Medications














Discontinued Medications














Generic Name Dose Route Start Last Admin





  Trade Name Freq  PRN Reason Stop Dose Admin


 


Hydrochlorothiazide  12.5 mg  05/08/18 19:52  05/08/18 20:10





  Hydrochlorothiazide  PO  05/08/18 19:53  12.5 mg





  ONETIME ONE   Administration





     





     





     





     


 


Ibuprofen  600 mg  05/08/18 16:02  05/08/18 16:06





  Motrin  PO  05/08/18 16:03  600 mg





  ONETIME ONE   Administration





     





     





     





     


 


Lorazepam  1 mg  05/08/18 21:10  05/08/18 21:18





  Ativan  PO  05/08/18 21:11  1 mg





  ONETIME ONE   Administration





     





     





     





     


 


Metoprolol Tartrate  12.5 mg  05/08/18 18:46  05/08/18 18:54





  Lopressor  PO  05/08/18 18:47  12.5 mg





  ONETIME ONE   Administration





     





     





     





     


 


Valsartan  40 mg  05/08/18 19:52  05/08/18 20:11





  Diovan  PO  05/08/18 19:53  40 mg





  ONETIME ONE   Administration





     





     





     





     














- Re-Assessments/Exams


Free Text/Narrative Re-Assessment/Exam: 





05/08/18 19:02


 chest xray looked ok. Her chem looked ok.  urine is clear. Will proceed with a 

cat scan of the head. 


05/08/18 19:02








Departure





- Departure


Disposition: Home, Self-Care 01


Clinical Impression: 


 Hypertensive urgency





Referrals: 


PCP,None [Primary Care Provider] - 


Forms:  ED Department Discharge


Additional Instructions: 


Resume your regular medications, please follow-up with your primary care 

provider in the next 2-3 days for reevaluation





<MikalarRobert - Last Filed: 05/08/18 21:37>





Departure





- Departure


Time of Disposition: 21:36


Condition: Fair





- Assessment/Plan


Plan: 





Assessment





Acuity = acute





Site and laterality = illness  with poor blood pressure control





Etiology  = unclear etiology  





Manifestations = none 





Location of injury =  Home





Lab values = exceeding a CBC, CMP unremarkable BNP elevated approximately 2400  

consistent with a fluid overload type pattern, CT scan of the head shows no 

acute process





Plan


Plan is discharge home follow-up primary care in the next 2-3 days for 

reevaluation recommend more aggressive diuretics for fluid overload pattern 

blood pressure on discharge was in the 160s systolic range  

















 This note was dictated using dragon voice recognition software please call 

with any questions on syntax or grammar.

## 2018-05-09 NOTE — CR
Chest 2V

 

INDICATION: sob fatique

 

FINDINGS: Comparison 4/19/2016. Mild hyperinflation. Blunting of the costophrenic angles. Mild scatte
red fibrotic changes, stable. Minimal infiltrate or atelectasis left lung base, new since prior exam.
 Hypertrophic changes thoracic spine. Aortic calcification. Chest otherwise negative.

## 2019-04-13 ENCOUNTER — HOSPITAL ENCOUNTER (EMERGENCY)
Dept: HOSPITAL 11 - JP.ED | Age: 84
Discharge: HOME | End: 2019-04-13
Payer: MEDICARE

## 2019-04-13 VITALS — SYSTOLIC BLOOD PRESSURE: 191 MMHG | DIASTOLIC BLOOD PRESSURE: 88 MMHG

## 2019-04-13 DIAGNOSIS — Z79.899: ICD-10-CM
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
## 2019-04-13 DIAGNOSIS — I10: ICD-10-CM

## 2019-04-13 DIAGNOSIS — S63.502A: Primary | ICD-10-CM

## 2019-04-13 DIAGNOSIS — Z79.82: ICD-10-CM

## 2019-04-13 NOTE — EDM.PDOC
ED HPI GENERAL MEDICAL PROBLEM





- General


Chief Complaint: Neurological Problem


Stated Complaint: DIZZINESS, WEAKNESS


Time Seen by Provider: 04/13/19 12:48


Source of Information: Reports: Patient, Old Records, RN Notes Reviewed


History Limitations: Reports: No Limitations





- History of Present Illness


INITIAL COMMENTS - FREE TEXT/NARRATIVE: 





86-year-old female presents emergency department today with a couple of issues 

first is she had fallen outstretched hand about a week ago she has had some 

pain and bruising in her left wrist, the next issue is she recently had dental 

extraction on Tuesday of this last week she does have some tenderness over that 

area and is concerned about infection. She was dizzy this morning but that now 

has resolved





- Related Data


 Allergies











Allergy/AdvReac Type Severity Reaction Status Date / Time


 


No Known Allergies Allergy   Verified 04/13/19 13:01











Home Meds: 


 Home Meds





Ascorbic Acid [Vitamin C] 500 mg PO DAILY 04/19/16 [History]


Aspirin [Ecotrin] 325 mg PO DAILY 04/19/16 [History]


Calcium Carbonate/Vitamin D3 [Calcium 500 + Vit D 400] 1 each PO DAILY 04/19/16 

[History]


Hydrochlorothiazide/Valsartan [Diovan HCT 80-12.5 MG] 0.5 tab PO BID 04/19/16 [

History]


Metoprolol Tartrate 12.5 mg PO BID 04/19/16 [History]


Multivitamin/Iron/Folic Acid [Centrum Adults Tablet] 1 tab PO DAILY 05/02/18 [

History]


Escitalopram [Lexapro] 10 mg PO DAILY 05/04/18 [History]











Past Medical History


HEENT History: Reports: Impaired Vision


Other HEENT History: recent tooth extraction on tues  after 3 different rounds 

of antibiotics


Cardiovascular History: Reports: Angina, CAD, High Cholesterol, Hypertension, MI

, Syncope


OB/GYN History: Reports: Pregnancy


Musculoskeletal History: Reports: Fracture


Other Musculoskeletal History: wrist


Psychiatric History: Reports: Anxiety


Hematologic History: Reports: Hemochromatosis





- Infectious Disease History


Infectious Disease History: Reports: Chicken Pox, Measles, Shingles





- Past Surgical History


Head Surgeries/Procedures: Reports: None


HEENT Surgical History: Reports: Eye Surgery


Cardiovascular Surgical History: Reports: Percutaneous Transluminal Angioplasty


GI Surgical History: Reports: Colonoscopy


Musculoskeletal Surgical History: Reports: None


Dermatological Surgical History: Reports: None





Social & Family History





- Tobacco Use


Smoking Status *Q: Never Smoker





- Caffeine Use


Caffeine Use: Reports: None





ED ROS GENERAL





- Review of Systems


Review Of Systems: See Below


Constitutional: Reports: Fatigue


HEENT: Reports: Dental Pain


Respiratory: Reports: No Symptoms


Cardiovascular: Reports: No Symptoms


GI/Abdominal: Reports: No Symptoms


: Reports: No Symptoms


Musculoskeletal: Reports: Joint Pain (Left wrist pain)


Skin: Reports: Bruising


Neurological: Reports: No Symptoms





ED EXAM, GENERAL





- Physical Exam


Exam: See Below


Free Text/Narrative:: 





General: Female, not in any distress, alert and oriented x3 HEENT: head is 

atraumatic normocephalic, eyes pupils equal round reactive to light, sclera 

clear no conjunctivitis appreciated.  Ears tympanic membranes clear and gray 

landmarks and light reflex are present bilaterally canals are clear.  Nose no 

septal deviation, nares are clear, no blood present.  Mouth mucosa is moist and 

pink no erythema or exudate noted in soft palate, tongue is midline uvula is 

midline, tooth #2 has been extracted there is tenderness around this area as 

well as discoloration of the gum.


Neck: Supple no thyromegaly no tracheal deviation.


Nodes: Cervical nodes subclavicular nodes nontender no palpable lymphadenopathy 

noted.


Lungs: clear to auscultation bilaterally with symmetrical respirations, no 

adventitious noise appreciated.


CV: Regular rate and rhythm S1 and S2 appreciated no murmurs rubs or gallops 

noted.


Abdomen: Soft, nontender, no palpable masses or organomegaly appreciated, no 

distention no guarding bowel sounds are present, .


Examination of the left wrist she is tender over the thenar eminence there is 

some bruising which can be appreciated around the thenar eminence into the 

wrist area however she has full range of motion of the wrist





Course





- Vital Signs


Last Recorded V/S: 


 Last Vital Signs











Temp  97.3 F   04/13/19 13:15


 


Pulse  69   04/13/19 13:15


 


Resp  16   04/13/19 13:15


 


BP  191/88 H  04/13/19 13:15


 


Pulse Ox  93 L  04/13/19 13:15








 





Orthostatic Blood Pressure [     186/66


Standing]                        


Orthostatic Blood Pressure [     178/73


Sitting]                         


Orthostatic Blood Pressure [     187/82


Supine]                          











- Orders/Labs/Meds


Labs: 


 Laboratory Tests











  04/13/19 04/13/19 Range/Units





  14:06 14:06 


 


WBC  5.0   (4.5-11.0)  K/uL


 


RBC  4.90   (3.30-5.50)  M/uL


 


Hgb  13.9   (12.0-15.0)  g/dL


 


Hct  43.5   (36.0-48.0)  %


 


MCV  89   (80-98)  fL


 


MCH  28   (27-31)  pg


 


MCHC  32   (32-36)  %


 


Plt Count  193   (150-400)  K/uL


 


Sodium   138 L  (140-148)  mmol/L


 


Potassium   4.3  (3.6-5.2)  mmol/L


 


Chloride   100  (100-108)  mmol/L


 


Carbon Dioxide   31  (21-32)  mmol/L


 


Anion Gap   11.3  (5.0-14.0)  mmol/L


 


BUN   20 H  (7-18)  mg/dL


 


Creatinine   0.7  (0.6-1.0)  mg/dL


 


Est Cr Clr Drug Dosing   47.72  mL/min


 


Estimated GFR (MDRD)   > 60  (>60)  


 


Glucose   119 H  ()  mg/dL


 


Calcium   9.8  (8.5-10.1)  mg/dL














Departure





- Departure


Time of Disposition: 14:47 (')


Disposition: Home, Self-Care 01


Condition: Fair


Clinical Impression: 


Left wrist sprain


Qualifiers:


 Encounter type: initial encounter Qualified Code(s): S63.502A - Unspecified 

sprain of left wrist, initial encounter








- Discharge Information


Referrals: 


Adalberto Davenport MD [Primary Care Provider] - 


Forms:  ED Department Discharge


Additional Instructions: 


Follow-up with primary care as needed





- Assessment/Plan


Plan: 





Assessment





Acuity = acute





Site and laterality = left wrist sprain recent dental extraction  





Etiology  = secondary to a fall  





Manifestations = none  





Location of injury =  Home





Lab values = x-ray of the wrist shows no fracture CBC and BMP were unremarkable

  





Plan


Follow-up with primary care as needed

















 This note was dictated using dragon voice recognition software please call 

with any questions on syntax or grammar.

## 2019-04-13 NOTE — CRLCR
[facility] pain 



4 views of the left wrist.



FINDINGS:



Normal alignment. No acute fractures. Degenerative changes at thumb CMC 

joint. 



Impression : No acute fracture.



Dictated by Carlie Baltazar MD @ Apr 13 2019  2:26PM



Signed by Dr. Carlie Baltazar @ Apr 13 2019  2:27PM

## 2020-11-09 ENCOUNTER — HOSPITAL ENCOUNTER (EMERGENCY)
Dept: HOSPITAL 11 - JP.ED | Age: 85
Discharge: HOME | End: 2020-11-09
Payer: MEDICARE

## 2020-11-09 VITALS — DIASTOLIC BLOOD PRESSURE: 100 MMHG | HEART RATE: 66 BPM | SYSTOLIC BLOOD PRESSURE: 221 MMHG

## 2020-11-09 DIAGNOSIS — I10: Primary | ICD-10-CM

## 2020-11-09 DIAGNOSIS — F41.9: ICD-10-CM

## 2020-11-09 DIAGNOSIS — Z79.899: ICD-10-CM

## 2020-11-09 DIAGNOSIS — J45.909: ICD-10-CM

## 2020-11-09 DIAGNOSIS — I25.10: ICD-10-CM

## 2020-11-09 DIAGNOSIS — I25.2: ICD-10-CM

## 2020-11-09 DIAGNOSIS — Z79.82: ICD-10-CM

## 2020-11-09 NOTE — EDM.PDOC
ED HPI GENERAL MEDICAL PROBLEM





- General


Chief Complaint: General


Stated Complaint: DIZZYNESS, FAINTED FRIDAY


Time Seen by Provider: 11/09/20 12:15


Source of Information: Reports: Patient


History Limitations: Reports: No Limitations





- History of Present Illness


INITIAL COMMENTS - FREE TEXT/NARRATIVE: 





7-year-old female with a known history of hypertension does take medicine for 

hypertension from her primary doctor is noted that she has not felt well at 

times recently although she does have stress with her granddaughter.  She nearly

fainted in the shower couple of days ago and was able to lay down and felt 

better subsequently.  However she has felt a little strange from time to time 

with a little bit of dizziness and weakness.  It is compatible potentially with 

some spikes of high blood pressure and she did have a temp pressure of 205 when 

she came in this morning although 180 when I saw her on exam.  She has no other 

specific complaints.  She thinks she may be just having stress.  Denies any 

chest pain or shortness of breath.  Her appetite is fine she is otherwise 

functioning normally


Duration: Intermittent


Severity: Mild


Improves with: Reports: None


Worsens with: Reports: None


Context: Reports: Activity


Associated Symptoms: Reports: No Other Symptoms





- Related Data


                                    Allergies











Allergy/AdvReac Type Severity Reaction Status Date / Time


 


No Known Allergies Allergy   Verified 04/13/19 13:01











Home Meds: 


                                    Home Meds





Ascorbic Acid [Vitamin C] 500 mg PO DAILY 04/19/16 [History]


Aspirin [Ecotrin] 325 mg PO DAILY 04/19/16 [History]


Calcium Carbonate/Vitamin D3 [Calcium 500 + Vit D 400] 1 each PO DAILY 04/19/16 

[History]


Hydrochlorothiazide/Valsartan [Diovan HCT 80-12.5 MG] 0.5 tab PO BID 04/19/16 

[History]


Metoprolol Tartrate 12.5 mg PO BID 04/19/16 [History]


Multivitamin/Iron/Folic Acid [Centrum Adults Tablet] 1 tab PO DAILY 05/02/18 

[History]


Escitalopram [Lexapro] 10 mg PO DAILY 05/04/18 [History]











Past Medical History


HEENT History: Reports: Impaired Vision


Other HEENT History: recent tooth extraction on tues  after 3 different rounds 

of antibiotics


Cardiovascular History: Reports: Angina, CAD, High Cholesterol, Hypertension, 

MI, Syncope


Respiratory History: Reports: Asthma


Gastrointestinal History: Reports: None


OB/GYN History: Reports: Pregnancy


Musculoskeletal History: Reports: Fracture


Other Musculoskeletal History: wrist


Psychiatric History: Reports: Anxiety


Hematologic History: Reports: Hemochromatosis





- Infectious Disease History


Infectious Disease History: Reports: Chicken Pox, Measles, Mumps





- Past Surgical History


Head Surgeries/Procedures: Reports: None


HEENT Surgical History: Reports: Eye Surgery


Cardiovascular Surgical History: Reports: Percutaneous Transluminal Angioplasty


GI Surgical History: Reports: Colonoscopy


Musculoskeletal Surgical History: Reports: None


Dermatological Surgical History: Reports: None





Social & Family History





- Tobacco Use


Tobacco Use Status *Q: Never Tobacco User





- Caffeine Use


Caffeine Use: Reports: Tea





- Recreational Drug Use


Recreational Drug Use: No





ED ROS GENERAL





- Review of Systems


Review Of Systems: See Below


Constitutional: Reports: Fatigue


HEENT: Reports: No Symptoms


Respiratory: Reports: No Symptoms


Cardiovascular: Reports: Lightheadedness


Endocrine: Reports: No Symptoms


GI/Abdominal: Reports: No Symptoms


: Reports: No Symptoms


Musculoskeletal: Reports: No Symptoms


Skin: Reports: No Symptoms


Neurological: Reports: Weakness


Psychiatric: Reports: No Symptoms





ED EXAM, GENERAL





- Physical Exam


Exam: See Below


Free Text/Narrative:: 





Cooperative female in no distress lying quietly on the gurney while she visits 

with me.  Vital signs are okay except for a blood pressure systolic of 180.  

Head exam is entirely normal pupils equal round reactive to light extraocular 

motions are okay.  No facial droop.  Tongue is midline.  Neck is supple chest is

 clear with a regular rate and rhythm with no abnormal sounds abdomen soft 

active bowel sounds skin extremities are normal neurologic is no focal deficits 

and moves about normally


Exam Limited By: No Limitations


General Appearance: Alert, WD/WN, No Apparent Distress, Anxious


Nose: Normal Inspection


Throat/Mouth: Normal Inspection


Head: Atraumatic


Neck: Normal Inspection


Respiratory/Chest: No Respiratory Distress


Cardiovascular: Regular Rate, Rhythm, No Edema, No Murmur


GI/Abdominal: Normal Bowel Sounds, Soft, No Distention


Back Exam: Normal Inspection


Extremities: Normal Inspection





Course





- Vital Signs


Text/Narrative:: 





Old female with a history of hypertension on 2 medications metoprolol and 

hydrochlorothiazide with losartan and relatively small doses had an episode of 

dizziness and fainting in the shower and other episodes where she felt generally

 weak and not feeling right.  This is compatible with episodes of perhaps 

hypertension and she came in with a blood pressure 200/90 years old today and it

 stayed at 185 throughout her visit.  Chemistries and cardiogram were okay.





248 I spoke with Dr. Spurling who suggested that we double the losartan 

hydrochlorothiazide and follow-up in the office patient is agreeable to that


Last Recorded V/S: 


                                Last Vital Signs











Temp  36.6 C   11/09/20 11:19


 


Pulse  66   11/09/20 11:19


 


Resp  16   11/09/20 11:19


 


BP  221/100 H  11/09/20 11:19


 


Pulse Ox  95   11/09/20 11:19














- Orders/Labs/Meds


Orders: 


                               Active Orders 24 hr











 Category Date Time Status


 


 EKG Documentation Completion [RC] ASDIRECTED Care  11/09/20 12:38 Active


 


 EKG 12 Lead [EK] Stat Ther  11/09/20 12:38 Ordered











Labs: 


                                Laboratory Tests











  11/09/20 11/09/20 Range/Units





  12:48 12:48 


 


WBC  5.8   (4.5-11.0)  K/uL


 


RBC  4.77   (3.30-5.50)  M/uL


 


Hgb  13.7   (12.0-15.0)  g/dL


 


Hct  42.3   (36.0-48.0)  %


 


MCV  89   (80-98)  fL


 


MCH  29   (27-31)  pg


 


MCHC  32   (32-36)  %


 


Plt Count  187   (150-400)  K/uL


 


Sodium   140  (140-148)  mmol/L


 


Potassium   4.1  (3.6-5.2)  mmol/L


 


Chloride   102  (100-108)  mmol/L


 


Carbon Dioxide   32  (21-32)  mmol/L


 


Anion Gap   6.0  (5.0-14.0)  mmol/L


 


BUN   18  (7-18)  mg/dL


 


Creatinine   0.8  (0.6-1.0)  mg/dL


 


Est Cr Clr Drug Dosing   40.98  mL/min


 


Estimated GFR (MDRD)   > 60  (>60)  


 


Glucose   102  ()  mg/dL


 


Calcium   8.9  (8.5-10.1)  mg/dL


 


Total Bilirubin   0.4  (0.2-1.0)  mg/dL


 


AST   19  (15-37)  U/L


 


ALT   24  (12-78)  U/L


 


Alkaline Phosphatase   101  ()  U/L


 


Total Protein   7.1  (6.4-8.2)  g/dL


 


Albumin   3.5  (3.4-5.0)  g/dL


 


Globulin   3.6 H  (2.3-3.5)  g/dL


 


Albumin/Globulin Ratio   1.0 L  (1.2-2.2)  














- Re-Assessments/Exams


Free Text/Narrative Re-Assessment/Exam: 





11/09/20 14:48


Dr. Novak will arrange for a visit in the office in the next few days





Departure





- Departure


Time of Disposition: 14:51


Disposition: Home, Self-Care 01


Condition: Good


Clinical Impression: 


 Hypertension








- Discharge Information


Referrals: 


Adalberto Davenport MD [Primary Care Provider] - 


Forms:  ED Department Discharge


Additional Instructions: 


Double your dose of losartan until seen by Dr. Spurling





Sepsis Event Note (ED)





- Evaluation


Sepsis Screening Result: No Definite Risk





- Focused Exam


Vital Signs: 


                                   Vital Signs











  Temp Pulse Resp BP Pulse Ox


 


 11/09/20 11:19  36.6 C  66  16  221/100 H  95














- My Orders


Last 24 Hours: 


My Active Orders





11/09/20 12:38


EKG Documentation Completion [RC] ASDIRECTED 


EKG 12 Lead [EK] Stat 














- Assessment/Plan


Last 24 Hours: 


My Active Orders





11/09/20 12:38


EKG Documentation Completion [RC] ASDIRECTED 


EKG 12 Lead [EK] Stat

## 2022-04-24 ENCOUNTER — HOSPITAL ENCOUNTER (EMERGENCY)
Dept: HOSPITAL 11 - JP.ED | Age: 87
Discharge: HOME | End: 2022-04-24
Payer: MEDICARE

## 2022-04-24 VITALS — SYSTOLIC BLOOD PRESSURE: 196 MMHG | DIASTOLIC BLOOD PRESSURE: 74 MMHG | HEART RATE: 68 BPM

## 2022-04-24 DIAGNOSIS — I25.2: ICD-10-CM

## 2022-04-24 DIAGNOSIS — K62.89: Primary | ICD-10-CM

## 2022-04-24 DIAGNOSIS — I10: ICD-10-CM

## 2022-04-24 DIAGNOSIS — Z79.82: ICD-10-CM

## 2022-04-24 DIAGNOSIS — F41.9: ICD-10-CM

## 2022-04-24 DIAGNOSIS — M16.0: ICD-10-CM

## 2022-04-24 DIAGNOSIS — E78.00: ICD-10-CM

## 2022-04-24 DIAGNOSIS — I25.119: ICD-10-CM

## 2022-12-31 ENCOUNTER — HOSPITAL ENCOUNTER (EMERGENCY)
Facility: CLINIC | Age: 87
Discharge: HOME | End: 2022-12-31
Attending: FAMILY MEDICINE | Admitting: FAMILY MEDICINE
Payer: MEDICARE

## 2022-12-31 ENCOUNTER — APPOINTMENT (OUTPATIENT)
Dept: ULTRASOUND IMAGING | Facility: CLINIC | Age: 87
End: 2022-12-31
Attending: FAMILY MEDICINE
Payer: MEDICARE

## 2022-12-31 VITALS
RESPIRATION RATE: 16 BRPM | TEMPERATURE: 98.1 F | WEIGHT: 147.8 LBS | HEART RATE: 65 BPM | DIASTOLIC BLOOD PRESSURE: 87 MMHG | OXYGEN SATURATION: 93 % | SYSTOLIC BLOOD PRESSURE: 199 MMHG

## 2022-12-31 DIAGNOSIS — M79.652 PAIN OF LEFT THIGH: ICD-10-CM

## 2022-12-31 PROCEDURE — 99284 EMERGENCY DEPT VISIT MOD MDM: CPT | Mod: 25 | Performed by: FAMILY MEDICINE

## 2022-12-31 PROCEDURE — 99284 EMERGENCY DEPT VISIT MOD MDM: CPT | Performed by: FAMILY MEDICINE

## 2022-12-31 PROCEDURE — 93971 EXTREMITY STUDY: CPT | Mod: LT

## 2022-12-31 ASSESSMENT — ACTIVITIES OF DAILY LIVING (ADL): ADLS_ACUITY_SCORE: 35

## 2023-01-01 NOTE — ED PROVIDER NOTES
History     Chief Complaint   Patient presents with     Knee Pain     HPI  Toma Goss is a 89 year old female who presents to the ED with left knee pain.  She had left hip replacement on 12/5/2022 and has been doing good with her rehab.  Noticed some mild discomfort just superior medial to the left knee couple weeks ago but really escalated over the last 3 days.  Pain is now extended up the medial aspect of her thigh and she is concerned about a blood clot.  She is on an aspirin a day but no other blood thinners.  The hip feels much better than prior to surgery and her wound looks good.  She denies any shortness of breath.  Originally from [place] but staying with her daughter locally while she recovers.  She is taking ibuprofen for pain as Tylenol seems to make her heart flutter.  She had some oxycodone postoperatively but stopped that for 5 days ago.  Does not like how it makes her feel drowsy although it did help her sleep all night.    Does have hypertension and is on meds.  She took those today.        Allergies:  Allergies   Allergen Reactions     Pravastatin Muscle Pain (Myalgia)     Rosuvastatin Other (See Comments)     Kidney pains  Kidney pains       Simvastatin Muscle Pain (Myalgia)     Amoxicillin Other (See Comments)     Does not work  Does not work         Problem List:    There are no problems to display for this patient.       Past Medical History:    No past medical history on file.    Past Surgical History:    No past surgical history on file.    Family History:    No family history on file.    Social History:  Marital Status:   [5]        Medications:    No current outpatient medications on file.        Review of Systems   All other systems reviewed and are negative.      Physical Exam   BP: (!) 221/93  Pulse: 64  Temp: 98.1  F (36.7  C)  Resp: 16  Weight: 67 kg (147 lb 12.8 oz)  SpO2: 94 %      Physical Exam  Constitutional:       Appearance: Normal appearance.   Pulmonary:       Effort: Pulmonary effort is normal. No respiratory distress.   Musculoskeletal:      Left hip: No tenderness.      Left upper leg: Tenderness present.      Left knee: No effusion or erythema. No LCL laxity or MCL laxity.       Legs:       Comments: Surgical scar looks nice and clean, well healed.   Neurological:      Mental Status: She is alert.         ED Course                 Procedures              Critical Care time:  none               Results for orders placed or performed during the hospital encounter of 12/31/22 (from the past 24 hour(s))   US Lower Extremity Venous Duplex Left    Narrative    EXAM: US LOWER EXTREMITY VENOUS DUPLEX LEFT  LOCATION: [facility]  DATE/TIME: 12/31/2022 8:58 PM    INDICATION: Medial left thigh pain after recent left hip arthroplasty.  COMPARISON: 12/20/2022.  TECHNIQUE: Venous Duplex ultrasound of the left lower extremity with and without compression, augmentation and duplex. Color flow and spectral Doppler with waveform analysis performed.    FINDINGS: Exam includes the common femoral, femoral, popliteal, and contralateral common femoral veins as well as segmentally visualized deep calf veins and greater saphenous vein.     LEFT: No deep vein thrombosis. No superficial thrombophlebitis.       Impression    IMPRESSION:    No deep venous thrombosis in the visualized left lower extremity.       Medications - No data to display    Assessments & Plan (with Medical Decision Making)  89-year-old female status post left SIRIA on 12/5/2022's been doing well with her rehab but now developed some left medial thigh pain worsening over the last 3 days.  No associated shortness of breath.  Takes an aspirin but not on no other anticoagulation.  Left lower extremity ultrasound was negative for DVT.  Suspect this is muscular in etiology.  Ice/heat.  She will continue with the ibuprofen during the day and can use her oxycodone sparingly at bedtime.  She may even  just cut it in half so does not make her so drowsy.    She then asked about postnasal drainage.  She has an allergy medicine and Flonase at home that she is can start up again and she could try nasal saline irrigation as well.    She will follow-up with her orthopedic physician if she has persistent problems.  Verbal and written discharge instructions given.  She and her daughter are comfortable with this plan.     I have reviewed the nursing notes.    I have reviewed the findings, diagnosis, plan and need for follow up with the patient.           New Prescriptions    No medications on file       Final diagnoses:   Pain of left thigh - no DVT, suspect muscular etiology       12/31/2022   [facility] EMERGENCY DEPT     Raj Medeiros MD  12/31/22 [id number]

## 2023-01-01 NOTE — DISCHARGE INSTRUCTIONS
Your ultrasound showed no evidence of a blood clot in your leg which is very reassuring.  I suspect your pain is coming from the muscles in your thigh.  Keep doing your rehab for your hip surgery.  The physical therapist may be able to help you with this as well.  Ice or heat may be helpful.  Continue with the ibuprofen during the day and you can use a small amount of oxycodone sparingly for pain at bedtime as you can sleep.  You may even want to just break in half and see if that is enough and would make you less drowsy the next day.    As far as your postnasal drainage, you can try nasal saline irrigation in addition to your allergy medicine and your Flonase nasal spray.  [employer] is one brand that makes a kit which includes an irrigation bottle and 50 pre-mixed saline packets.  Use distilled water, not tap water.  Follow the excellent directions in the package.  It was a pleasure visiting with both of you this evening.  I wish you the very best going forward with your physical therapy.  Happy New Year!        Thank you for choosing [facility]. We appreciate the opportunity to meet your urgent medical needs. Please let us know if we could have done anything to make your stay more satisfying.    After discharge, please closely monitor for any new or worsening symptoms. Return to the Emergency Department if you develop any acute worsening signs or symptoms.    If you had lab work, cultures or imaging studies done during your stay, the final results may still be pending. We will call you if your plan of care needs to change. However, if you are not improving as expected, please follow up with your primary care provider or clinic.     Start any prescription medications that were prescribed to you and take them as directed.     Please see additional handouts that may be pertinent to your condition.

## 2023-01-01 NOTE — ED TRIAGE NOTES
Pt presents with left knee pain. Pt had recent left hip replacement 12/05/2022 at [facility]. Pt concerned about blood clot .     Triage Assessment     Row Name 12/31/22 1944       Triage Assessment (Adult)    Airway WDL WDL       Respiratory WDL    Respiratory WDL WDL       Skin Circulation/Temperature WDL    Skin Circulation/Temperature WDL WDL       Cardiac WDL    Cardiac WDL WDL       Peripheral/Neurovascular WDL    Peripheral Neurovascular WDL WDL

## 2023-12-27 ENCOUNTER — APPOINTMENT (OUTPATIENT)
Dept: GENERAL RADIOLOGY | Facility: CLINIC | Age: 88
End: 2023-12-27
Attending: STUDENT IN AN ORGANIZED HEALTH CARE EDUCATION/TRAINING PROGRAM
Payer: MEDICARE

## 2023-12-27 ENCOUNTER — HOSPITAL ENCOUNTER (EMERGENCY)
Facility: CLINIC | Age: 88
Discharge: HOME | End: 2023-12-27
Attending: STUDENT IN AN ORGANIZED HEALTH CARE EDUCATION/TRAINING PROGRAM | Admitting: STUDENT IN AN ORGANIZED HEALTH CARE EDUCATION/TRAINING PROGRAM
Payer: MEDICARE

## 2023-12-27 VITALS
TEMPERATURE: 98.2 F | OXYGEN SATURATION: 95 % | HEART RATE: 63 BPM | WEIGHT: 150.5 LBS | RESPIRATION RATE: 20 BRPM | SYSTOLIC BLOOD PRESSURE: 187 MMHG | DIASTOLIC BLOOD PRESSURE: 84 MMHG

## 2023-12-27 DIAGNOSIS — I10 PRIMARY HYPERTENSION: ICD-10-CM

## 2023-12-27 DIAGNOSIS — R07.89 CHEST DISCOMFORT: ICD-10-CM

## 2023-12-27 DIAGNOSIS — M94.0 COSTOCHONDRITIS: Primary | ICD-10-CM

## 2023-12-27 LAB
ALBUMIN UR-MCNC: NEGATIVE MG/DL
APPEARANCE UR: CLEAR
BILIRUB UR QL STRIP: NEGATIVE
COLOR UR AUTO: YELLOW
FLUAV RNA SPEC QL NAA+PROBE: NEGATIVE
FLUBV RNA RESP QL NAA+PROBE: NEGATIVE
GLUCOSE UR STRIP-MCNC: NEGATIVE MG/DL
HGB UR QL STRIP: NEGATIVE
HOLD SPECIMEN: NORMAL
HOLD SPECIMEN: NORMAL
HYALINE CASTS: 1 /LPF
KETONES UR STRIP-MCNC: NEGATIVE MG/DL
LEUKOCYTE ESTERASE UR QL STRIP: ABNORMAL
MUCOUS THREADS #/AREA URNS LPF: PRESENT /LPF
NITRATE UR QL: NEGATIVE
PH UR STRIP: 7 [PH] (ref 5–7)
RBC URINE: 1 /HPF
RSV RNA SPEC NAA+PROBE: NEGATIVE
SARS-COV-2 RNA RESP QL NAA+PROBE: NEGATIVE
SP GR UR STRIP: 1.01 (ref 1–1.03)
SQUAMOUS EPITHELIAL: <1 /HPF
TROPONIN T SERPL HS-MCNC: 14 NG/L
UROBILINOGEN UR STRIP-MCNC: NORMAL MG/DL
WBC URINE: 1 /HPF

## 2023-12-27 PROCEDURE — 81003 URINALYSIS AUTO W/O SCOPE: CPT | Performed by: STUDENT IN AN ORGANIZED HEALTH CARE EDUCATION/TRAINING PROGRAM

## 2023-12-27 PROCEDURE — 93005 ELECTROCARDIOGRAM TRACING: CPT | Performed by: STUDENT IN AN ORGANIZED HEALTH CARE EDUCATION/TRAINING PROGRAM

## 2023-12-27 PROCEDURE — 99284 EMERGENCY DEPT VISIT MOD MDM: CPT | Mod: 25 | Performed by: STUDENT IN AN ORGANIZED HEALTH CARE EDUCATION/TRAINING PROGRAM

## 2023-12-27 PROCEDURE — 87637 SARSCOV2&INF A&B&RSV AMP PRB: CPT | Mod: GZ | Performed by: STUDENT IN AN ORGANIZED HEALTH CARE EDUCATION/TRAINING PROGRAM

## 2023-12-27 PROCEDURE — 36415 COLL VENOUS BLD VENIPUNCTURE: CPT | Performed by: STUDENT IN AN ORGANIZED HEALTH CARE EDUCATION/TRAINING PROGRAM

## 2023-12-27 PROCEDURE — 84484 ASSAY OF TROPONIN QUANT: CPT | Performed by: STUDENT IN AN ORGANIZED HEALTH CARE EDUCATION/TRAINING PROGRAM

## 2023-12-27 PROCEDURE — 93010 ELECTROCARDIOGRAM REPORT: CPT | Performed by: STUDENT IN AN ORGANIZED HEALTH CARE EDUCATION/TRAINING PROGRAM

## 2023-12-27 PROCEDURE — 99285 EMERGENCY DEPT VISIT HI MDM: CPT | Mod: 25 | Performed by: STUDENT IN AN ORGANIZED HEALTH CARE EDUCATION/TRAINING PROGRAM

## 2023-12-27 PROCEDURE — 250N000013 HC RX MED GY IP 250 OP 250 PS 637: Performed by: STUDENT IN AN ORGANIZED HEALTH CARE EDUCATION/TRAINING PROGRAM

## 2023-12-27 PROCEDURE — 71045 X-RAY EXAM CHEST 1 VIEW: CPT

## 2023-12-27 RX ORDER — METOPROLOL TARTRATE 25 MG/1
25 TABLET, FILM COATED ORAL ONCE
Status: COMPLETED | OUTPATIENT
Start: 2023-12-27 | End: 2023-12-27

## 2023-12-27 RX ADMIN — METOPROLOL TARTRATE 25 MG: 25 TABLET, FILM COATED ORAL at 21:55

## 2023-12-27 ASSESSMENT — ENCOUNTER SYMPTOMS
ACTIVITY CHANGE: 0
CHILLS: 0
NAUSEA: 0
VOMITING: 0
ABDOMINAL PAIN: 0
DYSURIA: 0
FEVER: 0
PALPITATIONS: 0
COUGH: 1
DIZZINESS: 0
FREQUENCY: 0
FLANK PAIN: 0
FACIAL SWELLING: 0
APPETITE CHANGE: 0
FATIGUE: 0
DIARRHEA: 0

## 2023-12-27 ASSESSMENT — ACTIVITIES OF DAILY LIVING (ADL)
ADLS_ACUITY_SCORE: 33
ADLS_ACUITY_SCORE: 35

## 2023-12-28 NOTE — ED TRIAGE NOTES
Patient presents with concern for infection in her chest. Patient states that she has been having chest pain that comes and goes for the last few months. Patient states that she was on an inhaler that increased her risk for infection but is no longer taking the medication.

## 2023-12-28 NOTE — ED PROVIDER NOTES
History     Chief Complaint   Patient presents with    Chest Pain     HPI  Toma Goss is a 89 year old female presenting with chest pain that is been going on for the past 6 weeks or more.  She explained that the bilateral on the anterior aspect of her chest does not radiate into her neck or shoulder or into her arms.  She has no nausea vomiting or sweats with this.  She does have a history of significant hypertension and presented with blood pressures of 209/97 on repeat 187/84 but not taken her nighttime medication yet therefore she asked for her metoprolol 25 mg tartrate.  She has not had any significant abdominal pain confusion headaches dizziness lower lower leg swelling.  He does complain of a mild cough that is been intermittently sputum productive and notes that she has recently been diagnosed with emphysema and discontinued her breathing treatments.    Allergies:  Allergies   Allergen Reactions    Pravastatin Muscle Pain (Myalgia)    Rosuvastatin Other (See Comments)     Kidney pains  Kidney pains      Simvastatin Muscle Pain (Myalgia)    Amoxicillin Other (See Comments)     Does not work  Does not work         Problem List:    There are no problems to display for this patient.       Past Medical History:    No past medical history on file.    Past Surgical History:    No past surgical history on file.    Family History:    No family history on file.    Social History:  Marital Status:   [5]        Medications:    No current outpatient medications on file.        Review of Systems   Constitutional:  Negative for activity change, appetite change, chills, fatigue and fever.   HENT:  Negative for congestion, ear pain and facial swelling.    Respiratory:  Positive for cough.    Cardiovascular:  Positive for chest pain. Negative for palpitations.   Gastrointestinal:  Negative for abdominal pain, diarrhea, nausea and vomiting.   Genitourinary:  Negative for dysuria, flank pain and frequency.    Neurological:  Negative for dizziness.   All other systems reviewed and are negative.      Physical Exam   BP: (!) 209/97  Pulse: 68  Temp: 98.2  F (36.8  C)  Resp: 20  Weight: 68.3 kg (150 lb 8 oz)  SpO2: 95 %      Physical Exam    ED Course                 Procedures         EKG self interpreted at 9:18 PM.  Normal sinus rhythm.  No significant T wave inversions concerning for ischemia.  No arrhythmia noted.  Positive for elevated QRS segments consistent with likely left ventricular hypertrophy.  Normal EKG             Results for orders placed or performed during the hospital encounter of 12/27/23 (from the past 24 hour(s))   XR Chest Port 1 View    Narrative    EXAM: XR CHEST PORT 1 VIEW  LOCATION: [facility]  DATE: 12/27/2023    INDICATION: Chest pain,cough  COMPARISON: None.      Impression    IMPRESSION: Cardiac silhouette at the upper limit of normal in size given portable technique. Likely subtle reticular/fibrotic changes throughout both lungs without definite airspace consolidation, pleural effusion or pneumothorax. No acute bony   abnormality.   Troponin T, High Sensitivity   Result Value Ref Range    Troponin T, High Sensitivity 14 <=14 ng/L   Bee Draw    Narrative    The following orders were created for panel order Bee Draw.  Procedure                               Abnormality         Status                     ---------                               -----------         ------                     Extra Purple Top Tube[[id number]]                            In process                 Extra Heparinized Syringe[[id number]]                        Final result                 Please view results for these tests on the individual orders.   Extra Heparinized Syringe   Result Value Ref Range    Hold Specimen     UA with Microscopic reflex to Culture    Specimen: Urine, Midstream   Result Value Ref Range    Color Urine Yellow Colorless, Straw, Light Yellow, Yellow     Appearance Urine Clear Clear    Glucose Urine Negative Negative mg/dL    Bilirubin Urine Negative Negative    Ketones Urine Negative Negative mg/dL    Specific Gravity Urine 1.012 1.003 - 1.035    Blood Urine Negative Negative    pH Urine 7.0 5.0 - 7.0    Protein Albumin Urine Negative Negative mg/dL    Urobilinogen Urine Normal Normal, 2.0 mg/dL    Nitrite Urine Negative Negative    Leukocyte Esterase Urine Trace (A) Negative    Mucus Urine Present (A) None Seen /LPF    RBC Urine 1 <=2 /HPF    WBC Urine 1 <=5 /HPF    Squamous Epithelials Urine <1 <=1 /HPF    Hyaline Casts Urine 1 <=2 /LPF    Narrative    Urine Culture not indicated       Medications   metoprolol tartrate (LOPRESSOR) tablet 25 mg (25 mg Oral $Given 12/27/23 9221)       Assessments & Plan (with Medical Decision Making)     I have reviewed the nursing notes.    I have reviewed the findings, diagnosis, plan and need for follow up with the patient.      Medical Decision Making  9-year-old female presenting with chest pain been going on for the last 6 weeks.  She notes it is tender when she palpates the anterior aspect of her chest bilaterally.  She has a mild cough and noted to have been diagnosed with emphysema recently.  On examination there is no significant wheeze or rails abdominal pain or any acute signs of overlying skin changes.  There is tenderness to palpation throughout the costochondral joints left and right side.  She has no lower leg edema or signs of JVP.  EKG with signs of the ventricular pretrip fee.  Troponin at 14.  Patient is significant hypertensive therefore provided patient with Toprol 25 mg as patient takes at home.  Discussed importance of following outpatient with her primary care to discuss other medications as she already shows EKG changes consistent for the high ventricular pressures that may cause cardiac problems in the future.  Patient understands as well as daughter at bedside.  Return precautions discussed and  outpatient follow-up recommendations provided.  At this time with no acute signs of hypoxia, tachycardia or any recent changes in mobility do not believe patient suffering any acute PE.  No signs of pneumonia pneumothorax or cardiopulmonary pathology noted on chest x-ray could be contribute to patient's symptoms.  Patient otherwise stable and discharged home with outpatient follow-up.        New Prescriptions    No medications on file       Final diagnoses:   Costochondritis   Chest discomfort   Primary hypertension       12/27/2023   [facility] EMERGENCY DEPT       Yasmeen Mejia MD  12/27/23 [id number]

## 2023-12-28 NOTE — DISCHARGE INSTRUCTIONS
Please follow-up with your primary care to discuss chest pain as with this time you can use ibuprofen Tylenol to help with pain control and improvement of the symptoms.  These can often take 4 to 6 weeks to get better.  Please otherwise follow-up with your primary also discussed your high blood pressure.